# Patient Record
Sex: FEMALE | Race: WHITE | Employment: STUDENT | URBAN - METROPOLITAN AREA
[De-identification: names, ages, dates, MRNs, and addresses within clinical notes are randomized per-mention and may not be internally consistent; named-entity substitution may affect disease eponyms.]

---

## 2017-02-01 PROBLEM — M84.374A STRESS REACTION OF RIGHT FOOT, INITIAL ENCOUNTER: Status: ACTIVE | Noted: 2017-02-01

## 2017-02-10 PROCEDURE — 87480 CANDIDA DNA DIR PROBE: CPT | Performed by: OBSTETRICS & GYNECOLOGY

## 2017-02-10 PROCEDURE — 87591 N.GONORRHOEAE DNA AMP PROB: CPT | Performed by: OBSTETRICS & GYNECOLOGY

## 2017-02-10 PROCEDURE — 87491 CHLMYD TRACH DNA AMP PROBE: CPT | Performed by: OBSTETRICS & GYNECOLOGY

## 2017-02-10 PROCEDURE — 87660 TRICHOMONAS VAGIN DIR PROBE: CPT | Performed by: OBSTETRICS & GYNECOLOGY

## 2017-02-10 PROCEDURE — 87510 GARDNER VAG DNA DIR PROBE: CPT | Performed by: OBSTETRICS & GYNECOLOGY

## 2017-11-29 PROCEDURE — 87591 N.GONORRHOEAE DNA AMP PROB: CPT | Performed by: OBSTETRICS & GYNECOLOGY

## 2017-11-29 PROCEDURE — 87491 CHLMYD TRACH DNA AMP PROBE: CPT | Performed by: OBSTETRICS & GYNECOLOGY

## 2019-06-17 ENCOUNTER — HOSPITAL ENCOUNTER (OUTPATIENT)
Age: 24
Discharge: HOME | End: 2019-06-17
Attending: FAMILY MEDICINE
Payer: COMMERCIAL

## 2019-06-17 ENCOUNTER — APPOINTMENT (OUTPATIENT)
Dept: GENERAL RADIOLOGY | Age: 24
End: 2019-06-17
Attending: FAMILY MEDICINE
Payer: COMMERCIAL

## 2019-06-17 VITALS
HEART RATE: 64 BPM | WEIGHT: 147 LBS | SYSTOLIC BLOOD PRESSURE: 104 MMHG | OXYGEN SATURATION: 100 % | TEMPERATURE: 99 F | HEIGHT: 66 IN | RESPIRATION RATE: 16 BRPM | DIASTOLIC BLOOD PRESSURE: 71 MMHG | BODY MASS INDEX: 23.63 KG/M2

## 2019-06-17 DIAGNOSIS — S96.912A MUSCLE STRAIN OF FOOT, LEFT, INITIAL ENCOUNTER: Primary | ICD-10-CM

## 2019-06-17 PROCEDURE — 73630 X-RAY EXAM OF FOOT: CPT | Performed by: FAMILY MEDICINE

## 2019-06-17 PROCEDURE — 99213 OFFICE O/P EST LOW 20 MIN: CPT

## 2019-06-17 PROCEDURE — 99203 OFFICE O/P NEW LOW 30 MIN: CPT

## 2019-06-17 NOTE — ED PROVIDER NOTES
Patient Seen in: [address] [facility]    History   Patient presents with:  Lower Extremity Injury (musculoskeletal)    Stated Complaint: left foot injury x1 day    HPI    66-year-old female with a history of stress fracture to the ri the distal aspect of the dorsal foot along the second and third metatarsals. No tenderness on palpation of the distal tibia and fibula. No tenderness on palpation of the left malleoli.   No tenderness on palpation of the heel, midfoot, or plantar forefoot

## 2020-01-06 ENCOUNTER — HOSPITAL ENCOUNTER (OUTPATIENT)
Age: 25
Discharge: HOME | End: 2020-01-06
Attending: FAMILY MEDICINE
Payer: COMMERCIAL

## 2020-01-06 VITALS
RESPIRATION RATE: 16 BRPM | WEIGHT: 145 LBS | BODY MASS INDEX: 23.3 KG/M2 | OXYGEN SATURATION: 98 % | HEIGHT: 66 IN | SYSTOLIC BLOOD PRESSURE: 119 MMHG | TEMPERATURE: 100 F | HEART RATE: 77 BPM | DIASTOLIC BLOOD PRESSURE: 81 MMHG

## 2020-01-06 DIAGNOSIS — J02.9 ACUTE PHARYNGITIS, UNSPECIFIED ETIOLOGY: Primary | ICD-10-CM

## 2020-01-06 LAB — POCT RAPID STREP: NEGATIVE

## 2020-01-06 PROCEDURE — 87081 CULTURE SCREEN ONLY: CPT | Performed by: FAMILY MEDICINE

## 2020-01-06 PROCEDURE — 87430 STREP A AG IA: CPT

## 2020-01-06 PROCEDURE — 87430 STREP A AG IA: CPT | Performed by: FAMILY MEDICINE

## 2020-01-06 PROCEDURE — 99214 OFFICE O/P EST MOD 30 MIN: CPT

## 2020-01-06 RX ORDER — CEPHALEXIN 500 MG/1
500 CAPSULE ORAL 2 TIMES DAILY
Qty: 20 CAPSULE | Refills: 0 | Status: SHIPPED | OUTPATIENT
Start: 2020-01-06 | End: 2020-01-16

## 2020-01-06 NOTE — ED INITIAL ASSESSMENT (HPI)
The patient is here for evaluation of a sore throat x 3 days, she states she has had a fever in this time frame but hasn't taken her temperature, and today started with a dry cough. Some bilateral ear pain.   She has taken ibuprofen and romina-seltzer sinus

## 2020-01-06 NOTE — ED PROVIDER NOTES
Patient Seen in: [address] [facility]      History   Patient presents with:  Sore Throat  Cough/URI    Stated Complaint: sore throat / fever x3 days    HPI    *22-year-old female presents to the immediate care today with a chief comp conjunctivae/corneas clear. PERRL, EOM's intact. Fundi benign. Ears: normal TM's and external ear canals both ears  Nose: Nares normal. Septum midline. Mucosa normal. No drainage or sinus tenderness.   Throat: abnormal findings: moderate oropharyngeal eryt

## 2021-10-19 ENCOUNTER — APPOINTMENT (OUTPATIENT)
Dept: GENERAL RADIOLOGY | Age: 26
End: 2021-10-19
Attending: EMERGENCY MEDICINE
Payer: COMMERCIAL

## 2021-10-19 ENCOUNTER — HOSPITAL ENCOUNTER (EMERGENCY)
Age: 26
Discharge: HOME | End: 2021-10-19
Attending: EMERGENCY MEDICINE
Payer: COMMERCIAL

## 2021-10-19 VITALS
OXYGEN SATURATION: 99 % | BODY MASS INDEX: 22 KG/M2 | HEART RATE: 79 BPM | RESPIRATION RATE: 16 BRPM | DIASTOLIC BLOOD PRESSURE: 79 MMHG | SYSTOLIC BLOOD PRESSURE: 121 MMHG | WEIGHT: 140 LBS | TEMPERATURE: 98 F

## 2021-10-19 DIAGNOSIS — S61.552A DOG BITE OF WRIST, LEFT, INITIAL ENCOUNTER: Primary | ICD-10-CM
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
## 2021-10-19 PROCEDURE — 12002 RPR S/N/AX/GEN/TRNK2.6-7.5CM: CPT

## 2021-10-19 PROCEDURE — 99283 EMERGENCY DEPT VISIT LOW MDM: CPT

## 2021-10-19 PROCEDURE — 73110 X-RAY EXAM OF WRIST: CPT | Performed by: EMERGENCY MEDICINE

## 2021-10-19 RX ORDER — AMOXICILLIN AND CLAVULANATE POTASSIUM 875; 125 MG/1; MG/1
1 TABLET, FILM COATED ORAL 2 TIMES DAILY
Qty: 20 TABLET | Refills: 0 | Status: SHIPPED | OUTPATIENT
Start: 2021-10-19 | End: 2021-10-29

## 2021-10-19 RX ORDER — HYDROCODONE BITARTRATE AND ACETAMINOPHEN 5; 325 MG/1; MG/1
2 TABLET ORAL ONCE
Status: COMPLETED | OUTPATIENT
Start: 2021-10-19 | End: 2021-10-19

## 2021-10-19 NOTE — ED PROVIDER NOTES
Patient Seen in: THE [facility] In [facility]      History   Patient presents with:  Bite    Stated Complaint: dog bite    Subjective:   HPI    The patient is a 55-year-old previously healthy right-hand-dominant female presenting to the teddy extremities. Sensation in the ulnar, median distributions are intact. HEENT: Supple neck without any meningismus or rigidity. Cardiovascular: Radial pulses 2+ bilaterally.   Cap refill is normal.  Lungs: Speaking full sentences without any distress or r =====    CONCLUSION:  No acute fracture or dislocation. There is a small amount of gas in the soft tissues along the dorsum of the     lateral aspect of the distal wrist.  No radiopaque foreign body.               Dictated by (C in 7-10 days          Medications Prescribed:  Current Discharge Medication List    START taking these medications    amoxicillin clavulanate 875-125 MG Oral Tab  Take 1 tablet by mouth 2 (two) times daily for 10 days.   Qty: 20 tablet Refills: 0

## (undated) NOTE — LETTER
Date & Time: 6/17/2019, 2:29 PM  Patient: Cedric Herrera  Encounter Provider(s):    Antonio Mckeon MD       To Whom It May Concern:    Wilberto Harvey was seen and treated in our department on 6/17/2019.  She can return to work with these limitations: Pl

## (undated) NOTE — LETTER
Date & Time: 10/19/2021, 9:50 AM  Patient: Ashlie Ellis  Encounter Provider(s):    Micah Rodríguez MD       To Whom It May Concern:    Acacia Mendoza was seen and treated in our department on 10/19/2021.  She should not return to work until 10/20/2021